# Patient Record
Sex: FEMALE | Race: ASIAN | Employment: OTHER | URBAN - METROPOLITAN AREA
[De-identification: names, ages, dates, MRNs, and addresses within clinical notes are randomized per-mention and may not be internally consistent; named-entity substitution may affect disease eponyms.]

---

## 2017-03-01 PROBLEM — M18.11 DEGENERATIVE ARTHRITIS OF THUMB, RIGHT: Status: ACTIVE | Noted: 2017-03-01

## 2017-03-01 PROBLEM — M18.12 DEGENERATIVE ARTHRITIS OF THUMB, LEFT: Status: ACTIVE | Noted: 2017-03-01

## 2017-06-22 PROBLEM — I83.90 ASYMPTOMATIC VARICOSE VEINS: Status: ACTIVE | Noted: 2017-06-22

## 2017-11-07 PROBLEM — M18.0 ARTHRITIS OF CARPOMETACARPAL (CMC) JOINTS OF BOTH THUMBS: Status: ACTIVE | Noted: 2017-11-07

## 2017-12-01 RX ORDER — LOSARTAN POTASSIUM 50 MG/1
TABLET ORAL
Qty: 90 TABLET | Refills: 0 | Status: SHIPPED | OUTPATIENT
Start: 2017-12-01 | End: 2017-12-04

## 2017-12-04 ENCOUNTER — OFFICE VISIT (OUTPATIENT)
Dept: INTERNAL MEDICINE CLINIC | Facility: CLINIC | Age: 72
End: 2017-12-04

## 2017-12-04 VITALS
HEIGHT: 63 IN | SYSTOLIC BLOOD PRESSURE: 126 MMHG | RESPIRATION RATE: 14 BRPM | BODY MASS INDEX: 21.62 KG/M2 | WEIGHT: 122 LBS | HEART RATE: 68 BPM | DIASTOLIC BLOOD PRESSURE: 68 MMHG

## 2017-12-04 DIAGNOSIS — Z12.31 VISIT FOR SCREENING MAMMOGRAM: ICD-10-CM

## 2017-12-04 DIAGNOSIS — Z00.00 ENCOUNTER FOR ANNUAL HEALTH EXAMINATION: ICD-10-CM

## 2017-12-04 PROBLEM — M18.11 DEGENERATIVE ARTHRITIS OF THUMB, RIGHT: Status: RESOLVED | Noted: 2017-03-01 | Resolved: 2017-12-04

## 2017-12-04 PROBLEM — M18.12 DEGENERATIVE ARTHRITIS OF THUMB, LEFT: Status: RESOLVED | Noted: 2017-03-01 | Resolved: 2017-12-04

## 2017-12-04 PROCEDURE — G0008 ADMIN INFLUENZA VIRUS VAC: HCPCS | Performed by: INTERNAL MEDICINE

## 2017-12-04 PROCEDURE — 90653 IIV ADJUVANT VACCINE IM: CPT | Performed by: INTERNAL MEDICINE

## 2017-12-04 PROCEDURE — G0402 INITIAL PREVENTIVE EXAM: HCPCS | Performed by: INTERNAL MEDICINE

## 2017-12-04 RX ORDER — LOSARTAN POTASSIUM 50 MG/1
50 TABLET ORAL
Qty: 90 TABLET | Refills: 3 | Status: SHIPPED | OUTPATIENT
Start: 2017-12-04 | End: 2018-12-06

## 2017-12-04 NOTE — PROGRESS NOTES
Bryon Cotton is a 67year old female who presents for a complete physical exam. Sees various specialists for various bone and joint issues  She went to see Dr Edwardo Plascencia and she did labs due to her medications and was told to see me    Wt Readings from Never Smoker                                                              Smokeless tobacco: Never Used                      Alcohol use: No              Occ: works [employer] office : n.  Children: 0, LMP:menopause, Exercise daily     REVIEW OF SYSTEMS equal,    ASSESSMENT AND PLAN:   Reva Jean is a 67year old female who presents for a complete physical exam.  breast and pelvic done. Order put in for mammogram- has osteopenia, off bisphos. , on vit d and calcium exercises.   Appropriate lab testing or

## 2017-12-04 NOTE — PROGRESS NOTES
HPI:   Eve Roth is a 67year old female who presents for a {Medicare Annual Wellness Description:3401}.     ***  Her last annual assessment has been over 1 year: Annual Physical due on 12/01/2017         Fall/Risk Assessment   She has been screened f Directive [place]::\"Advance care planning including the explanation and discussion of advance directives standard forms performed Face to Face with patient and Family/surrogate (if present), and forms available to patient in AVS\"}         She has n capsule (300 mg total) by mouth nightly.    Triamcinolone Acetonide 0.025 % External Ointment Apply to AA qd PRN for itching, redness and irritation   Desoximetasone 0.25 % External Cream Apply to affected area BID   Flaxseed, Linseed, (FLAX SEED OIL OR) [employer] Systems:7352::\" \"}    EXAM:   /68 (BP Location: Left arm, Patient Position: Sitting, Cuff Size: adult)   Pulse 68   Resp 14   Ht 63\"   Wt 122 lb   BMI 21.61 kg/m²  Estimated body mass index is 21.61 kg/m² as calculated from the following:    Aguilar months, have you lost more than 10 pounds without trying?: 2 - No  Has your appetite been poor?: No  How does the patient maintain a good energy level?: Appropriate Exercise  How would you describe your daily physical activity?: Moderate  How would you pawan Chlamydia  Annually if high risk No results found for: CHLAMYDIA No flowsheet data found.     Screening Mammogram      Mammogram Annually to 76, then as discussed Mammogram,1 Yr due on 11/30/2016 Update Health Maintenance if applicable     Immunizations (Up found.               Template: NAA PATEL MEDICARE ANNUAL ASSESSMENT FEMALE [57714]

## 2018-12-06 ENCOUNTER — OFFICE VISIT (OUTPATIENT)
Dept: INTERNAL MEDICINE CLINIC | Facility: CLINIC | Age: 73
End: 2018-12-06
Payer: MEDICARE

## 2018-12-06 ENCOUNTER — LABORATORY ENCOUNTER (OUTPATIENT)
Dept: LAB | Age: 73
End: 2018-12-06
Attending: INTERNAL MEDICINE
Payer: MEDICARE

## 2018-12-06 VITALS
WEIGHT: 117.63 LBS | SYSTOLIC BLOOD PRESSURE: 122 MMHG | OXYGEN SATURATION: 99 % | DIASTOLIC BLOOD PRESSURE: 80 MMHG | RESPIRATION RATE: 14 BRPM | HEART RATE: 51 BPM | BODY MASS INDEX: 22.21 KG/M2 | HEIGHT: 61 IN | TEMPERATURE: 98 F

## 2018-12-06 DIAGNOSIS — Z12.31 ENCOUNTER FOR SCREENING MAMMOGRAM FOR BREAST CANCER: Primary | ICD-10-CM

## 2018-12-06 DIAGNOSIS — K62.9 ANAL LESION: ICD-10-CM

## 2018-12-06 DIAGNOSIS — Z78.0 POSTMENOPAUSAL: ICD-10-CM

## 2018-12-06 DIAGNOSIS — I10 ESSENTIAL HYPERTENSION: ICD-10-CM

## 2018-12-06 DIAGNOSIS — Z00.00 ENCOUNTER FOR ANNUAL HEALTH EXAMINATION: ICD-10-CM

## 2018-12-06 DIAGNOSIS — M85.80 OSTEOPENIA, UNSPECIFIED LOCATION: ICD-10-CM

## 2018-12-06 PROCEDURE — 80061 LIPID PANEL: CPT

## 2018-12-06 PROCEDURE — 80053 COMPREHEN METABOLIC PANEL: CPT

## 2018-12-06 PROCEDURE — 90653 IIV ADJUVANT VACCINE IM: CPT | Performed by: INTERNAL MEDICINE

## 2018-12-06 PROCEDURE — 82306 VITAMIN D 25 HYDROXY: CPT

## 2018-12-06 PROCEDURE — 85025 COMPLETE CBC W/AUTO DIFF WBC: CPT

## 2018-12-06 PROCEDURE — 36415 COLL VENOUS BLD VENIPUNCTURE: CPT

## 2018-12-06 PROCEDURE — G0008 ADMIN INFLUENZA VIRUS VAC: HCPCS | Performed by: INTERNAL MEDICINE

## 2018-12-06 PROCEDURE — 99397 PER PM REEVAL EST PAT 65+ YR: CPT | Performed by: INTERNAL MEDICINE

## 2018-12-06 PROCEDURE — G0438 PPPS, INITIAL VISIT: HCPCS | Performed by: INTERNAL MEDICINE

## 2018-12-06 PROCEDURE — 84443 ASSAY THYROID STIM HORMONE: CPT

## 2018-12-06 RX ORDER — LOSARTAN POTASSIUM 50 MG/1
50 TABLET ORAL
Qty: 90 TABLET | Refills: 3 | Status: SHIPPED | OUTPATIENT
Start: 2018-12-06 | End: 2019-12-09

## 2018-12-06 NOTE — PATIENT INSTRUCTIONS
Stephanie Estes's SCREENING SCHEDULE   Tests on this list are recommended by your physician but may not be covered, or covered at this frequency, by your insurer. Please check with your insurance carrier before scheduling to verify coverage.    PREVENTATIVE 65-75) IPPE only No results found for this or any previous visit.  Limited to patients who meet one of the following criteria:   • Men who are 73-68 years old and have smoked more than 100 cigarettes in their lifetime   • Anyone with a family history    Col on 11/30/2016 Please get this Mammogram regularly   Immunizations      Influenza  Covered Annually Orders placed or performed in visit on 12/01/16   • FLU VACC [address] ANTIG   Orders placed or performed in visit on 11/18/14   • FLU VACC PRSV FREE INC http://www. [place]ph.state. [place]/public/books/advin.htm  A link to the [employer]. This site has a lot of good information including definitions of the different types of Advance Directives.  It also has the State forms available on it's webs

## 2018-12-06 NOTE — PROGRESS NOTES
Gavino Levi is a 68year old female who presents for a complete physical exam  And AWV  Wt Readings from Last 6 Encounters:  12/06/18 : 117 lb 9.6 oz  11/07/18 : 118 lb  05/02/18 : 108 lb  12/04/17 : 122 lb  10/25/17 : 116 lb  06/22/17 : 108 lb    Bod Smokeless tobacco: Never Used    Alcohol use: No    Drug use: No    Occ:retired PO : n.  Children: 0, LMP:menopause, Exercise daily     REVIEW OF SYSTEMS:   GENERAL: feels well otherwise  SKIN: denies any unusual skin lesions  EYES:denies blurred given for healthy living as indicated. The patient indicates understanding of these issues and agrees to the plan. HTN controlled, osteopenia, mild, vitamin d level was good, cpm  The patient is asked to return for CPX in 1 year.     Encounter for screening Trigs LDL Cholesterol Calc (mg/dL)   Date Value   09/19/2006 126 (H)     LDL CHOLESTROL (mg/dL)   Date Value   11/14/2013 93     LDL Cholesterol (mg/dL)   Date Value   11/30/2015 85     CHOLESTEROL, TOTAL (mg/dL)   Date Value   09/19/2006 212 (H)     KENROY Requires diagnosis related to osteoporosis or estrogen deficiency.    Biennial benefit unless patient has history of long-term glucocorticoid use for medical condition    Last Dexa Scan:   XR DEXA BONE DENSITOMETRY (CPT=77080) 12/08/2016    No flowsheet karen retarded   Persons who live in the same house as a HepB virus carrier   Homosexual men   Illicit injectable drug abusers     Tetanus Toxoid- Only covered with a cut with metal- TD and TDaP Not covered by Medicare Part B) No orders found for this or any pre

## 2019-11-20 ENCOUNTER — OFFICE VISIT (OUTPATIENT)
Dept: SURGERY | Facility: CLINIC | Age: 74
End: 2019-11-20
Payer: MEDICARE

## 2019-11-20 VITALS
WEIGHT: 118 LBS | BODY MASS INDEX: 20.91 KG/M2 | TEMPERATURE: 98 F | HEIGHT: 63 IN | HEART RATE: 53 BPM | DIASTOLIC BLOOD PRESSURE: 74 MMHG | SYSTOLIC BLOOD PRESSURE: 148 MMHG

## 2019-11-20 DIAGNOSIS — K92.2 INTESTINAL BLEEDING: Primary | ICD-10-CM

## 2019-11-20 DIAGNOSIS — Z86.010 HISTORY OF COLON POLYPS: ICD-10-CM

## 2019-11-20 PROCEDURE — 99203 OFFICE O/P NEW LOW 30 MIN: CPT | Performed by: SURGERY

## 2019-11-20 PROCEDURE — 46600 DIAGNOSTIC ANOSCOPY SPX: CPT | Performed by: SURGERY

## 2019-11-20 RX ORDER — POLYETHYLENE GLYCOL 3350, SODIUM CHLORIDE, SODIUM BICARBONATE, POTASSIUM CHLORIDE 420; 11.2; 5.72; 1.48 G/4L; G/4L; G/4L; G/4L
POWDER, FOR SOLUTION ORAL
Qty: 1 BOTTLE | Refills: 0 | Status: SHIPPED | OUTPATIENT
Start: 2019-11-20 | End: 2019-12-09 | Stop reason: ALTCHOICE

## 2019-11-20 NOTE — H&P
New Patient Visit Note       Active Problems      1. Intestinal bleeding    2. History of colon polyps        Chief Complaint   Patient presents with:  Rectal Bleeding: NP referred by Dr. Tello Gee for rectal bleeding.  Pt states she is not having pain MD JOHNY at [address] [place] [address]   • CATARACT EXTRACTION Bilateral 2016   • CERVICAL EPIDURAL N/A 8/3/2015    Performed by Pearl Lewis MD at [facility] N/A 7/14/2015    Performed by Pearl Lewis MD at [facility] GABAPENTIN 300 MG Oral Cap, TAKE ONE CAPSULE BY MOUTH EVERY NIGHT, Disp: 90 capsule, Rfl: 2  •  Losartan Potassium 50 MG Oral Tab, Take 1 tablet (50 mg total) by mouth once daily. , Disp: 90 tablet, Rfl: 3  •  Triamcinolone Acetonide 0.025 % External Ointme for behavioral problems and sleep disturbance.        Physical Findings   /74 (BP Location: Right arm)   Pulse 53   Temp 97.6 °F (36.4 °C) (Oral)   Ht 63\"   Wt 118 lb (53.5 kg)   BMI 20.90 kg/m²   Physical Exam   Constitutional: She is oriented to pe is agreeable to proceed with the colonoscopy. · If only internal hemorrhoids are found on colonoscopy, she would be a candidate for rubberbanding.   The risk, benefits, and alternatives to rubber banding internal hemorrhoids were discussed with her in deta

## 2019-12-03 ENCOUNTER — HOSPITAL ENCOUNTER (OUTPATIENT)
Dept: MAMMOGRAPHY | Age: 74
Discharge: HOME | End: 2019-12-03
Attending: INTERNAL MEDICINE
Payer: MEDICARE

## 2019-12-03 DIAGNOSIS — Z12.31 ENCOUNTER FOR SCREENING MAMMOGRAM FOR BREAST CANCER: ICD-10-CM

## 2019-12-03 PROCEDURE — 77063 BREAST TOMOSYNTHESIS BI: CPT | Performed by: INTERNAL MEDICINE

## 2019-12-03 PROCEDURE — 77067 SCR MAMMO BI INCL CAD: CPT | Performed by: INTERNAL MEDICINE

## 2019-12-09 ENCOUNTER — OFFICE VISIT (OUTPATIENT)
Dept: INTERNAL MEDICINE CLINIC | Facility: CLINIC | Age: 74
End: 2019-12-09
Payer: MEDICARE

## 2019-12-09 ENCOUNTER — TELEPHONE (OUTPATIENT)
Dept: INTERNAL MEDICINE CLINIC | Facility: CLINIC | Age: 74
End: 2019-12-09

## 2019-12-09 VITALS
OXYGEN SATURATION: 98 % | BODY MASS INDEX: 22.35 KG/M2 | RESPIRATION RATE: 14 BRPM | HEIGHT: 60.87 IN | SYSTOLIC BLOOD PRESSURE: 114 MMHG | DIASTOLIC BLOOD PRESSURE: 68 MMHG | WEIGHT: 118.38 LBS | HEART RATE: 51 BPM

## 2019-12-09 DIAGNOSIS — Z78.0 POSTMENOPAUSAL: ICD-10-CM

## 2019-12-09 DIAGNOSIS — Z11.59 NEED FOR HEPATITIS C SCREENING TEST: ICD-10-CM

## 2019-12-09 DIAGNOSIS — I10 ESSENTIAL HYPERTENSION: ICD-10-CM

## 2019-12-09 DIAGNOSIS — Z00.00 ENCOUNTER FOR ANNUAL HEALTH EXAMINATION: ICD-10-CM

## 2019-12-09 DIAGNOSIS — K62.5 RECTAL BLEEDING: ICD-10-CM

## 2019-12-09 DIAGNOSIS — Z12.31 BREAST CANCER SCREENING BY MAMMOGRAM: Primary | ICD-10-CM

## 2019-12-09 DIAGNOSIS — M85.80 OSTEOPENIA, UNSPECIFIED LOCATION: ICD-10-CM

## 2019-12-09 DIAGNOSIS — E78.00 HIGH CHOLESTEROL: ICD-10-CM

## 2019-12-09 PROCEDURE — G0439 PPPS, SUBSEQ VISIT: HCPCS | Performed by: INTERNAL MEDICINE

## 2019-12-09 PROCEDURE — 99214 OFFICE O/P EST MOD 30 MIN: CPT | Performed by: INTERNAL MEDICINE

## 2019-12-09 RX ORDER — LOSARTAN POTASSIUM 50 MG/1
50 TABLET ORAL
Qty: 90 TABLET | Refills: 3 | Status: SHIPPED | OUTPATIENT
Start: 2019-12-09 | End: 2020-12-14

## 2019-12-09 RX ORDER — CHLORHEXIDINE GLUCONATE 0.12 MG/ML
RINSE ORAL
Refills: 3 | COMMUNITY
Start: 2019-11-26 | End: 2021-12-16 | Stop reason: ALTCHOICE

## 2019-12-09 NOTE — PROGRESS NOTES
HPI:   Mario Morin is a 76year old female who presents for a Medicare Subsequent Annual Wellness visit (Pt already had Initial Annual Wellness). I also treat her for her HTN. HTN:Denies CP, dyspnea, orthopnea, edema or palpitations.  Denies headache Bradycardia     Hypertension     Osteopenia     Adenomatous colon polyp     Cervical spondylosis     Bilateral shoulder pain, unspecified chronicity     Asymptomatic varicose veins     Arthritis of carpometacarpal (CMC) joints of both thumbs    Wt Readings Bradycardia (8/10/2010), Carpal tunnel syndrome (5/15/2008), Carpal tunnel syndrome, bilateral (7/20/2015), Cervical radiculopathy (8/10/2010), Cervicalgia (5/15/2008), Degenerative cervical disc (5/15/2008), Hypertension, Internal hemorrhoids (7/10), Late symmetrical, trachea midline and thyroid not enlarged, symmetric, no tenderness/mass/nodules  Lungs: clear to auscultation bilaterally  Breasts:  normal appearance, no masses or tenderness  Heart: S1, S2 normal, no murmur, click, rub or gallop, regular rat PLAN:  The patient indicates understanding of these issues and agrees to the plan. Reinforced healthy diet, lifestyle, and exercise. No follow-ups on file.      Andres Silverman MD, 12/9/2019     General Health     In the past six months, have BONE DENSITOMETRY (CPT=77080) 12/08/2016    No flowsheet data found. Pap and Pelvic      Pap: Every 3 yrs age 21-68 or Pap+HPV every 5 yrs age 33-67, age 72 and older at high risk There are no preventive care reminders to display for this patient.  [place] Creatinine  Annually Creatinine, Serum (mg/dL)   Date Value   12/15/2011 0.60     CREATININE (mg/dL)   Date Value   11/14/2013 0.30 (L)     Creatinine (mg/dL)   Date Value   12/06/2018 0.74   10/25/2017 0.68    No flowsheet data found.     Drug Serum Con

## 2019-12-09 NOTE — PATIENT INSTRUCTIONS
Stephanie Estes's SCREENING SCHEDULE   Tests on this list are recommended by your physician but may not be covered, or covered at this frequency, by your insurer. Please check with your insurance carrier before scheduling to verify coverage.    PREVENTATIVE following criteria:   • Men who are 73-68 years old and have smoked more than 100 cigarettes in their lifetime   • Anyone with a family history    Colorectal Cancer Screening  Covered up to Age 76     Colonoscopy Screen   Covered every 10 years- more often get this Mammogram regularly   Immunizations      Influenza  Covered Annually Orders placed or performed in visit on 12/01/16   • FLU VACC [address] ANTIG   Orders placed or performed in visit on 11/18/14   • FLU VACC [address] ANTIG   Orders place http://[email or web address] [place]ph.state. [place]/public/books/advin.htm  A link to the [employer]. This site has a lot of good information including definitions of the different types of Advance Directives.  It also has the State forms available on it's webs

## 2019-12-11 NOTE — TELEPHONE ENCOUNTER
Spoke to [id number] @ Dr. Eugenia Winter office and patient did have a Colonoscopy done on 12/6/2019 @ [facility], but the Procedure has not been uploaded to Chart yet. The [facility] has not forwarded a copy to Dr. Eugenia Winter office either.   PSR

## 2019-12-12 ENCOUNTER — PATIENT OUTREACH (OUTPATIENT)
Dept: SURGERY | Facility: CLINIC | Age: 74
End: 2019-12-12

## 2019-12-12 ENCOUNTER — LAB ENCOUNTER (OUTPATIENT)
Dept: LAB | Age: 74
End: 2019-12-12
Attending: INTERNAL MEDICINE
Payer: MEDICARE

## 2019-12-12 DIAGNOSIS — I10 ESSENTIAL HYPERTENSION: ICD-10-CM

## 2019-12-12 DIAGNOSIS — Z11.59 NEED FOR HEPATITIS C SCREENING TEST: ICD-10-CM

## 2019-12-12 DIAGNOSIS — K62.5 RECTAL BLEEDING: ICD-10-CM

## 2019-12-12 DIAGNOSIS — E78.00 HIGH CHOLESTEROL: ICD-10-CM

## 2019-12-12 PROCEDURE — 86803 HEPATITIS C AB TEST: CPT

## 2019-12-12 PROCEDURE — 85025 COMPLETE CBC W/AUTO DIFF WBC: CPT

## 2019-12-12 PROCEDURE — 80053 COMPREHEN METABOLIC PANEL: CPT

## 2019-12-12 PROCEDURE — 36415 COLL VENOUS BLD VENIPUNCTURE: CPT

## 2019-12-12 PROCEDURE — 80061 LIPID PANEL: CPT

## 2019-12-13 RX ORDER — LOSARTAN POTASSIUM 50 MG/1
TABLET ORAL
Qty: 90 TABLET | Refills: 3 | OUTPATIENT
Start: 2019-12-13

## 2020-01-08 ENCOUNTER — OFFICE VISIT (OUTPATIENT)
Dept: SURGERY | Facility: CLINIC | Age: 75
End: 2020-01-08
Payer: MEDICARE

## 2020-01-08 VITALS
HEART RATE: 53 BPM | DIASTOLIC BLOOD PRESSURE: 71 MMHG | HEIGHT: 61 IN | WEIGHT: 118 LBS | TEMPERATURE: 97 F | BODY MASS INDEX: 22.28 KG/M2 | SYSTOLIC BLOOD PRESSURE: 135 MMHG

## 2020-01-08 DIAGNOSIS — K64.8 HEMORRHOIDS, INTERNAL, WITH BLEEDING: Primary | ICD-10-CM

## 2020-01-08 PROCEDURE — 99213 OFFICE O/P EST LOW 20 MIN: CPT | Performed by: SURGERY

## 2020-01-08 PROCEDURE — 46221 LIGATION OF HEMORRHOID(S): CPT | Performed by: SURGERY

## 2020-01-08 NOTE — H&P
New Patient Visit Note       Active Problems      1. Hemorrhoids, internal, with bleeding        Chief Complaint   Patient presents with:  Hemorrhoids: Est pt new problem, pt here for hemorrhoids.   Pt would also like the results of her colonoscopy, she wou 7/8/2010    Performed by Wallace Myers MD at [facility]   • OTHER SURGICAL HISTORY      L wrist surgery   • UPPER ARM/ELBOW SURGERY UNLISTED  2000    left distal radial/ulnar joint reconstruction       The family history and social histo affected area BID As Needed. ), Disp: 1 Tube, Rfl: 3  •  Flaxseed, Linseed, (FLAX SEED OIL OR), Take 1 tablet by mouth daily. , Disp: , Rfl:   •  aspirin 81 MG Oral Tab, Take 81 mg by mouth daily. , Disp: , Rfl:   •  MOVE FREE 500-400 MG OR TABS, 2 TABLETS D Rectum:      Internal hemorrhoid present. No rectal mass, anal fissure, tenderness or abnormal anal tone.       Genitourinary Comments: No Rectocele  No Rectovaginal Fistula  No Episiotomy  Anal Sphincter Intact  No Pruritis Ani  No Lichenification  No

## 2020-01-08 NOTE — PROCEDURES
Pre op diagnosis: Symptomatic Internal Hemorrhoids    Post op diagnosis:  Symptomatic Internal Hemorrhoids    Procedure: Anoscopy with O-ring Rubber Band Ligation of Internal Hemorrhoids, 9:00    Surgeon:  Dr. Alize Spivey    Operative findings:    The

## 2020-01-09 ENCOUNTER — HOSPITAL ENCOUNTER (OUTPATIENT)
Dept: BONE DENSITY | Age: 75
Discharge: HOME | End: 2020-01-09
Attending: INTERNAL MEDICINE
Payer: MEDICARE

## 2020-01-09 DIAGNOSIS — Z78.0 POSTMENOPAUSAL: ICD-10-CM

## 2020-01-09 PROCEDURE — 77080 DXA BONE DENSITY AXIAL: CPT | Performed by: INTERNAL MEDICINE

## 2020-01-10 ENCOUNTER — MED REC SCAN ONLY (OUTPATIENT)
Dept: SURGERY | Facility: CLINIC | Age: 75
End: 2020-01-10

## 2020-01-29 ENCOUNTER — OFFICE VISIT (OUTPATIENT)
Dept: SURGERY | Facility: CLINIC | Age: 75
End: 2020-01-29
Payer: MEDICARE

## 2020-01-29 VITALS — HEART RATE: 47 BPM | TEMPERATURE: 98 F | SYSTOLIC BLOOD PRESSURE: 123 MMHG | DIASTOLIC BLOOD PRESSURE: 65 MMHG

## 2020-01-29 DIAGNOSIS — K64.8 INTERNAL HEMORRHOID, BLEEDING: Primary | ICD-10-CM

## 2020-01-29 PROCEDURE — 46221 LIGATION OF HEMORRHOID(S): CPT | Performed by: SURGERY

## 2020-01-29 NOTE — PROGRESS NOTES
Follow Up Visit Note       Active Problems      1. Internal hemorrhoid, bleeding          Chief Complaint   Patient presents with:  Hemorrhoid Bandinnd banding -- Denies constipation, diarrhea, or rectal bleeding. Denies n/v, fevers or chills. ARM/ELBOW SURGERY UNLISTED  2000    left distal radial/ulnar joint reconstruction       The family history and social history have been reviewed by me today.     Family History   Problem Relation Age of Onset   • Other (Other) Father         head injury   • 500-400 MG OR TABS, 2 TABLETS DAILY, Disp: , Rfl:   •  MAGNESIUM OR, DAILY, Disp: , Rfl:   •  VITAMIN D 1000 UNIT OR CAPS, 1 CAPSULE DAILY, Disp: , Rfl:   •  CALCIUM 1500 MG OR TABS, DAILY, Disp: , Rfl:   •  MULTIVITAMINS OR TABS, 1 TABLET DAILY, Disp: , R Fistula  No Episiotomy  Anal Sphincter Intact  No Pruritis Ani  No Lichenification  No Abscess  No Fistula in ano  No Anterior Fissure  No Posterior Fissure    See Procedures:  Rubber Banding       Neurological: She is alert and oriented to person, place,

## 2020-02-12 ENCOUNTER — OFFICE VISIT (OUTPATIENT)
Dept: SURGERY | Facility: CLINIC | Age: 75
End: 2020-02-12
Payer: MEDICARE

## 2020-02-12 VITALS
HEART RATE: 53 BPM | BODY MASS INDEX: 22.28 KG/M2 | DIASTOLIC BLOOD PRESSURE: 75 MMHG | HEIGHT: 61 IN | WEIGHT: 118 LBS | TEMPERATURE: 98 F | SYSTOLIC BLOOD PRESSURE: 137 MMHG

## 2020-02-12 DIAGNOSIS — K64.8 HEMORRHOIDS, INTERNAL, WITH BLEEDING: Primary | ICD-10-CM

## 2020-02-12 PROCEDURE — 46221 LIGATION OF HEMORRHOID(S): CPT | Performed by: SURGERY

## 2020-02-12 NOTE — PROGRESS NOTES
Follow Up Visit Note       Active Problems      1. Hemorrhoids, internal, with bleeding          Chief Complaint   Patient presents with:  Hemorrhoid Banding: Patient is here for 3rd hemorrhoid banding.          History of Present Illness  The patient prese reconstruction       The family history and social history have been reviewed by me today.     Family History   Problem Relation Age of Onset   • Other (Other) Father         head injury   • Cancer Mother         kidney cancer   • Other (Other) Brother DAILY, Disp: , Rfl:   •  VITAMIN D 1000 UNIT OR CAPS, 1 CAPSULE DAILY, Disp: , Rfl:   •  CALCIUM 1500 MG OR TABS, DAILY, Disp: , Rfl:   •  MULTIVITAMINS OR TABS, 1 TABLET DAILY, Disp: , Rfl:   •  VITAMIN E, DAILY, Disp: , Rfl:      Review of Systems  The R Fissure    See Procedures:  Rubber Banding       Neurological: She is alert and oriented to person, place, and time. Skin: Skin is warm and dry. Psychiatric: She has a normal mood and affect.  Her behavior is normal. Judgment and thought content normal.

## 2020-02-13 NOTE — PROCEDURES
Pre op diagnosis: Symptomatic Internal Hemorrhoids    Post op diagnosis:  Symptomatic Internal Hemorrhoids    Procedure: Anoscopy with O-ring Rubber Band Ligation of Internal Hemorrhoids, 6:00    Surgeon:  Dr. Rod López    Operative findings:    The

## 2020-02-28 ENCOUNTER — OFFICE VISIT (OUTPATIENT)
Dept: SURGERY | Facility: CLINIC | Age: 75
End: 2020-02-28
Payer: MEDICARE

## 2020-02-28 VITALS
WEIGHT: 118 LBS | SYSTOLIC BLOOD PRESSURE: 118 MMHG | TEMPERATURE: 99 F | HEART RATE: 53 BPM | BODY MASS INDEX: 22 KG/M2 | DIASTOLIC BLOOD PRESSURE: 67 MMHG

## 2020-02-28 DIAGNOSIS — K64.8 HEMORRHOIDS, INTERNAL, WITH BLEEDING: Primary | ICD-10-CM

## 2020-02-28 PROCEDURE — 46221 LIGATION OF HEMORRHOID(S): CPT | Performed by: SURGERY

## 2020-02-28 NOTE — PROGRESS NOTES
Follow Up Visit Note       Active Problems      1.  Hemorrhoids, internal, with bleeding          Chief Complaint   Patient presents with:  Hemorrhoid Banding: pt here for banding, pt denies bleeding or pain        History of Present Illness  The patient pr left distal radial/ulnar joint reconstruction       The family history and social history have been reviewed by me today.     Family History   Problem Relation Age of Onset   • Other (Other) Father         head injury   • Cancer Mother         kidney cancer , Rfl:   •  MAGNESIUM OR, DAILY, Disp: , Rfl:   •  VITAMIN D 1000 UNIT OR CAPS, 1 CAPSULE DAILY, Disp: , Rfl:   •  CALCIUM 1500 MG OR TABS, DAILY, Disp: , Rfl:   •  MULTIVITAMINS OR TABS, 1 TABLET DAILY, Disp: , Rfl:   •  VITAMIN E, DAILY, Disp: , Rfl: Fistula in ano  No Anterior Fissure  No Posterior Fissure    See Procedures:  Rubber Banding       Neurological: She is alert and oriented to person, place, and time. Skin: Skin is warm and dry. Psychiatric: She has a normal mood and affect.  Her behavi

## 2020-05-04 ENCOUNTER — OFFICE VISIT (OUTPATIENT)
Dept: SURGERY | Facility: CLINIC | Age: 75
End: 2020-05-04
Payer: MEDICARE

## 2020-05-04 VITALS — WEIGHT: 118 LBS | TEMPERATURE: 98 F | HEIGHT: 61 IN | BODY MASS INDEX: 22.28 KG/M2

## 2020-05-04 DIAGNOSIS — K64.4 INFLAMED EXTERNAL HEMORRHOID: Primary | ICD-10-CM

## 2020-05-04 PROCEDURE — 99213 OFFICE O/P EST LOW 20 MIN: CPT | Performed by: SURGERY

## 2020-05-04 NOTE — PROGRESS NOTES
Follow Up Visit Note       Active Problems      1. Inflamed external hemorrhoid          Chief Complaint   Patient presents with:  Hemorrhoids: Est pt completed banding treatments nut is concerned about asymptomatic external hemorrhoid.  Pt denies any pain EPIDURAL N/A 7/14/2015    Performed by Priscila Pearce MD at [address]   • COLONOSCOPY  6/4/2005    adenomatous polyp   • COLONOSCOPY  7/8/2010    hemorrhoids   • COLONOSCOPY  12/06/2019    Internal Hemorrhoids  Repeat 5 yrs.    • COLON Acetonide 0.025 % External Ointment, Apply to AA qd PRN for itching, redness and irritation, Disp: 80 g, Rfl: 3  •  Desoximetasone 0.25 % External Cream, Apply to affected area BID (Patient taking differently: Apply to affected area BID As Needed. ), Disp: well-nourished. She is cooperative. HENT:   Head: Normocephalic and atraumatic. Eyes: No scleral icterus. Neck: No tracheal deviation present. Genitourinary: Rectum:      External hemorrhoid (7:00) present.       No rectal mass, anal fissure, tender

## 2020-05-18 ENCOUNTER — TELEPHONE (OUTPATIENT)
Dept: INTERNAL MEDICINE CLINIC | Facility: CLINIC | Age: 75
End: 2020-05-18

## 2020-05-18 NOTE — TELEPHONE ENCOUNTER
Spoke to pt. Pt states has had leftover medications of Losartan 50mg & is now running low. Pt to  refill today.

## 2020-05-18 NOTE — TELEPHONE ENCOUNTER
Pt states that the pharm said that she does not have anymore refills. Her chart says that she has one left. For that reason I did put in for a refill.      Did the patient contact the pharmacy directly?:  yes    Is patient out of meds or supply very low?:

## 2020-05-18 NOTE — TELEPHONE ENCOUNTER
Spoke to Donald from [address].  States they have 4 refills on file & pt had not picked any up. Pharmacy to process now & will be ready in 90 minutes. Pharmacy states will give courtesy call when ready.     Left detailed message on pt'

## 2020-12-14 ENCOUNTER — OFFICE VISIT (OUTPATIENT)
Dept: INTERNAL MEDICINE CLINIC | Facility: CLINIC | Age: 75
End: 2020-12-14
Payer: MEDICARE

## 2020-12-14 ENCOUNTER — LAB ENCOUNTER (OUTPATIENT)
Dept: LAB | Age: 75
End: 2020-12-14
Attending: INTERNAL MEDICINE
Payer: MEDICARE

## 2020-12-14 VITALS
WEIGHT: 123 LBS | TEMPERATURE: 98 F | SYSTOLIC BLOOD PRESSURE: 134 MMHG | RESPIRATION RATE: 14 BRPM | OXYGEN SATURATION: 97 % | BODY MASS INDEX: 23.22 KG/M2 | HEIGHT: 60.95 IN | HEART RATE: 54 BPM | DIASTOLIC BLOOD PRESSURE: 82 MMHG

## 2020-12-14 DIAGNOSIS — M25.512 BILATERAL SHOULDER PAIN, UNSPECIFIED CHRONICITY: ICD-10-CM

## 2020-12-14 DIAGNOSIS — Z12.31 BREAST CANCER SCREENING BY MAMMOGRAM: ICD-10-CM

## 2020-12-14 DIAGNOSIS — Z00.00 ENCOUNTER FOR ANNUAL HEALTH EXAMINATION: Primary | ICD-10-CM

## 2020-12-14 DIAGNOSIS — I10 ESSENTIAL HYPERTENSION: ICD-10-CM

## 2020-12-14 DIAGNOSIS — M25.511 BILATERAL SHOULDER PAIN, UNSPECIFIED CHRONICITY: ICD-10-CM

## 2020-12-14 DIAGNOSIS — M85.80 OSTEOPENIA, UNSPECIFIED LOCATION: ICD-10-CM

## 2020-12-14 PROBLEM — I83.90 ASYMPTOMATIC VARICOSE VEINS: Status: RESOLVED | Noted: 2017-06-22 | Resolved: 2020-12-14

## 2020-12-14 PROBLEM — M18.0 ARTHRITIS OF CARPOMETACARPAL (CMC) JOINTS OF BOTH THUMBS: Status: RESOLVED | Noted: 2017-11-07 | Resolved: 2020-12-14

## 2020-12-14 PROCEDURE — 80061 LIPID PANEL: CPT

## 2020-12-14 PROCEDURE — G0439 PPPS, SUBSEQ VISIT: HCPCS | Performed by: INTERNAL MEDICINE

## 2020-12-14 PROCEDURE — 36415 COLL VENOUS BLD VENIPUNCTURE: CPT

## 2020-12-14 PROCEDURE — 99213 OFFICE O/P EST LOW 20 MIN: CPT | Performed by: INTERNAL MEDICINE

## 2020-12-14 PROCEDURE — 80053 COMPREHEN METABOLIC PANEL: CPT

## 2020-12-14 RX ORDER — GABAPENTIN 300 MG/1
300 CAPSULE ORAL NIGHTLY
Qty: 90 CAPSULE | Refills: 3 | Status: SHIPPED | OUTPATIENT
Start: 2020-12-14 | End: 2021-01-18

## 2020-12-14 RX ORDER — LOSARTAN POTASSIUM 50 MG/1
50 TABLET ORAL
Qty: 90 TABLET | Refills: 3 | Status: SHIPPED | OUTPATIENT
Start: 2020-12-14 | End: 2021-12-16

## 2020-12-14 NOTE — PATIENT INSTRUCTIONS
Stephanie Estes's SCREENING SCHEDULE   Tests on this list are recommended by your physician but may not be covered, or covered at this frequency, by your insurer. Please check with your insurance carrier before scheduling to verify coverage.    PREVENTATIVE for this or any previous visit.  Limited to patients who meet one of the following criteria:   • Men who are 73-68 years old and have smoked more than 100 cigarettes in their lifetime   • Anyone with a family history    Colorectal Cancer Screening  Covered for this patient.  Please get this Mammogram regularly   Immunizations      Influenza  Covered Annually Orders placed or performed in visit on 12/01/16   • FLU VACC PRSV FREE INC ANTIG   Orders placed or performed in visit on 11/18/14   • FLU VACC PRSV FREE http://www. [place].state. [place]/public/books/advin.htm  A link to the [facility]. This site has a lot of good information including definitions of the different types of Advance Directives.  It also has the State forms available on it's webs

## 2020-12-14 NOTE — PROGRESS NOTES
HPI:   Anabell Mullins is a 76year old female who presents for a Medicare Subsequent Annual Wellness visit (Pt already had Initial Annual Wellness). I also treat her for her HTN. HTN:Denies CP, dyspnea, orthopnea, edema or palpitations.  Denies headache Edwar Gurrola MD as Consulting Physician (Physical Medicine)  Kendy Mcgregor MD as Consulting Physician (SURGERY, GENERAL)    Patient Active Problem List:     Bradycardia     Hypertension     Osteopenia     Adenomatous colon polyp     Cervical spondylosis Tab, Take 81 mg by mouth daily.     •  MOVE FREE 500-400 MG OR TABS, 2 TABLETS DAILY    •  MAGNESIUM OR, DAILY    •  VITAMIN D 1000 UNIT OR CAPS, 1 CAPSULE DAILY    •  CALCIUM 1500 MG OR TABS, DAILY    •  MULTIVITAMINS OR TABS, 1 TABLET DAILY    •  VITAMIN Ht 5' 0.95\" (1.548 m)   Wt 123 lb (55.8 kg)   SpO2 97%   BMI 23.28 kg/m²  Estimated body mass index is 23.28 kg/m² as calculated from the following:    Height as of this encounter: 5' 0.95\" (1.548 m). Weight as of this encounter: 123 lb (55.8 kg). goal, CPM  -     COMP METABOLIC PANEL (14); Future    Osteopenia, unspecified location- check DEXA next year.  She exercises regularly and her D level is quite healthy    Shoulder /neck girdle pain- chronic, does well w/nightly gabapentin, CPM        High c found for this or any previous visit. No flowsheet data found. Fecal Occult Blood Annually No results found for: FOB No flowsheet data found.     Glaucoma Screening      Ophthalmology Visit Annually: Diabetics, FHx Glaucoma, AA>50, > 65 No flows benefits      SPECIFIC DISEASE MONITORING Internal Lab or Procedure External Lab or Procedure      Annual Monitoring of Persistent     Medications (ACE/ARB, digoxin diuretics, anticonvulsants.)    Potassium  Annually Potassium (mmol/L)   Date Value   12/12

## 2020-12-21 ENCOUNTER — HOSPITAL ENCOUNTER (OUTPATIENT)
Dept: MAMMOGRAPHY | Age: 75
Discharge: HOME | End: 2020-12-21
Attending: INTERNAL MEDICINE
Payer: MEDICARE

## 2020-12-21 DIAGNOSIS — Z12.31 BREAST CANCER SCREENING BY MAMMOGRAM: ICD-10-CM

## 2020-12-21 PROCEDURE — 77067 SCR MAMMO BI INCL CAD: CPT | Performed by: INTERNAL MEDICINE

## 2020-12-21 PROCEDURE — 77063 BREAST TOMOSYNTHESIS BI: CPT | Performed by: INTERNAL MEDICINE

## 2021-03-17 ENCOUNTER — IMMUNIZATION (OUTPATIENT)
Dept: LAB | Facility: HOSPITAL | Age: 76
End: 2021-03-17
Attending: HOSPITALIST
Payer: MEDICARE

## 2021-03-17 DIAGNOSIS — Z23 NEED FOR VACCINATION: Primary | ICD-10-CM

## 2021-03-17 PROCEDURE — 0011A SARSCOV2 VAC 100MCG/0.5ML IM: CPT

## 2021-04-14 ENCOUNTER — IMMUNIZATION (OUTPATIENT)
Dept: LAB | Facility: HOSPITAL | Age: 76
End: 2021-04-14
Attending: EMERGENCY MEDICINE
Payer: MEDICARE

## 2021-04-14 DIAGNOSIS — Z23 NEED FOR VACCINATION: Primary | ICD-10-CM

## 2021-04-14 PROCEDURE — 0012A SARSCOV2 VAC 100MCG/0.5ML IM: CPT

## 2021-10-19 ENCOUNTER — TELEPHONE (OUTPATIENT)
Dept: INTERNAL MEDICINE CLINIC | Facility: CLINIC | Age: 76
End: 2021-10-19

## 2021-10-19 NOTE — TELEPHONE ENCOUNTER
Noted last Dexa scan on 1/9/20  Dr. Cesar Riley recommended to repeat in 2 years  Advised pt to discuss dexa scan orders with [facility] in upcoming appt on 12/16/21  Pt v/u

## 2021-10-19 NOTE — TELEPHONE ENCOUNTER
Pt called and stated that she thinks that she is due for a dexa scan. Is Pt due for one and if yes can one be ordered for Pt?    Please let Pt know if dexa scan gets ordered   Thank you

## 2021-12-16 ENCOUNTER — OFFICE VISIT (OUTPATIENT)
Dept: INTERNAL MEDICINE CLINIC | Facility: CLINIC | Age: 76
End: 2021-12-16
Payer: MEDICARE

## 2021-12-16 ENCOUNTER — LAB ENCOUNTER (OUTPATIENT)
Dept: LAB | Age: 76
End: 2021-12-16
Attending: INTERNAL MEDICINE
Payer: MEDICARE

## 2021-12-16 VITALS
HEIGHT: 60.79 IN | HEART RATE: 52 BPM | TEMPERATURE: 98 F | BODY MASS INDEX: 23.05 KG/M2 | SYSTOLIC BLOOD PRESSURE: 118 MMHG | DIASTOLIC BLOOD PRESSURE: 64 MMHG | WEIGHT: 120.5 LBS | RESPIRATION RATE: 14 BRPM | OXYGEN SATURATION: 97 %

## 2021-12-16 DIAGNOSIS — Z78.0 POSTMENOPAUSAL: ICD-10-CM

## 2021-12-16 DIAGNOSIS — M85.80 OSTEOPENIA, UNSPECIFIED LOCATION: ICD-10-CM

## 2021-12-16 DIAGNOSIS — I10 PRIMARY HYPERTENSION: ICD-10-CM

## 2021-12-16 DIAGNOSIS — Z00.00 ENCOUNTER FOR ANNUAL HEALTH EXAMINATION: ICD-10-CM

## 2021-12-16 DIAGNOSIS — Z12.31 SCREENING MAMMOGRAM FOR BREAST CANCER: Primary | ICD-10-CM

## 2021-12-16 PROCEDURE — 80053 COMPREHEN METABOLIC PANEL: CPT

## 2021-12-16 PROCEDURE — 99213 OFFICE O/P EST LOW 20 MIN: CPT | Performed by: INTERNAL MEDICINE

## 2021-12-16 PROCEDURE — G0439 PPPS, SUBSEQ VISIT: HCPCS | Performed by: INTERNAL MEDICINE

## 2021-12-16 PROCEDURE — 36415 COLL VENOUS BLD VENIPUNCTURE: CPT

## 2021-12-16 RX ORDER — GABAPENTIN 300 MG/1
300 CAPSULE ORAL NIGHTLY
COMMUNITY
Start: 2021-11-18

## 2021-12-16 RX ORDER — LOSARTAN POTASSIUM 50 MG/1
50 TABLET ORAL
Qty: 90 TABLET | Refills: 3 | Status: SHIPPED | OUTPATIENT
Start: 2021-12-16

## 2021-12-16 NOTE — PROGRESS NOTES
HPI:   Marcial Ayala is a 68year old female who presents for a Medicare Subsequent Annual Wellness visit (Pt already had Initial Annual Wellness). I also treat her for her HTN. HTN:Denies CP, dyspnea, orthopnea, edema or palpitations.  Denies headache o Adenomatous colon polyp     Cervical spondylosis     Bilateral shoulder pain, unspecified chronicity    Wt Readings from Last 3 Encounters:  12/16/21 : 120 lb 8 oz (54.7 kg)  11/18/21 : 120 lb (54.4 kg)  05/20/21 : 123 lb (55.8 kg)     Last Cholesterol Lab (7/20/2015), Cervical radiculopathy (8/10/2010), Cervicalgia (5/15/2008), Degenerative cervical disc (5/15/2008), Hypertension, Internal hemorrhoids (7/10), Lateral epicondylitis  of elbow (4/14/2011), Myofascial pain syndrome, Osteopenia, Pain in thoracic all    Feeling down, depressed, or hopeless: Not at all    PHQ-2 SCORE: 0                 General appearance: alert, appears stated age and cooperative  Neck: no adenopathy, no carotid bruit, no JVD, supple, symmetrical, trachea midline and thyroid not enl level is quite healthy    Shoulder /neck girdle pain- per physiatry        High cholesterol- LDL <130,not on statin, she is low risk for CVD  -  No need to check this year         Diet assessment: excellent     PLAN:  The patient indicates understanding of Glaucoma Screening      Ophthalmology Visit Annually: Diabetics, FHx Glaucoma, AA>50, > 65 No flowsheet data found.     Bone Density Screening      Dexascan Every two years Last Dexa Scan:    XR DEXA BONE DENSITOMETRY (CPT=77080) 01/09/2020   No f Component Value Date    K 3.9 12/14/2020         Creatinine   Annually Lab Results   Component Value Date    CREATSERUM 0.65 12/14/2020         BUN Annually Lab Results   Component Value Date    BUN 17 12/14/2020       Drug Serum Conc Annually No results

## 2021-12-16 NOTE — PATIENT INSTRUCTIONS
Stephanie Estes's SCREENING SCHEDULE   Tests on this list are recommended by your physician but may not be covered, or covered at this frequency, by your insurer. Please check with your insurance carrier before scheduling to verify coverage.    PREVENTATIVE DENSITOMETRY (CPT=77080) 01/09/2020      No recommendations at this time   Pap and Pelvic    Pap   Covered every 2 years for women at normal risk;  Annually if at high risk -  No recommendations at this time    Chlamydia Annually if high risk -  No recommen http://www. [place]state. [place]/public/books/advin.htm  A link to the [employer]. This site has a lot of good information including definitions of the different types of Advance Directives.  It also has the State forms available on it's webs

## 2022-01-11 NOTE — PROCEDURES
Pre op diagnosis: Symptomatic Internal Hemorrhoids    Post op diagnosis:  Symptomatic Internal Hemorrhoids    Procedure: Anoscopy with O-ring Rubber Band Ligation of Internal Hemorrhoids, 7:00    Surgeon:  Dr. Vandana Hernandez    Operative findings:    The Oxybutynin Pregnancy And Lactation Text: This medication is Pregnancy Category B and is considered safe during pregnancy. It is unknown if it is excreted in breast milk.

## 2022-01-24 ENCOUNTER — HOSPITAL ENCOUNTER (OUTPATIENT)
Dept: MAMMOGRAPHY | Age: 77
Discharge: HOME | End: 2022-01-24
Attending: INTERNAL MEDICINE
Payer: MEDICARE

## 2022-01-24 ENCOUNTER — APPOINTMENT (OUTPATIENT)
Dept: BONE DENSITY | Age: 77
End: 2022-01-24
Attending: INTERNAL MEDICINE
Payer: MEDICARE

## 2022-01-24 DIAGNOSIS — Z12.31 SCREENING MAMMOGRAM FOR BREAST CANCER: ICD-10-CM

## 2022-01-24 PROCEDURE — 77067 SCR MAMMO BI INCL CAD: CPT | Performed by: INTERNAL MEDICINE

## 2022-01-24 PROCEDURE — 77063 BREAST TOMOSYNTHESIS BI: CPT | Performed by: INTERNAL MEDICINE

## 2022-02-04 ENCOUNTER — HOSPITAL ENCOUNTER (OUTPATIENT)
Dept: BONE DENSITY | Age: 77
Discharge: HOME | End: 2022-02-04
Attending: INTERNAL MEDICINE
Payer: MEDICARE

## 2022-02-04 DIAGNOSIS — Z78.0 POSTMENOPAUSAL: ICD-10-CM

## 2022-02-04 PROCEDURE — 77080 DXA BONE DENSITY AXIAL: CPT | Performed by: INTERNAL MEDICINE

## 2022-02-17 ENCOUNTER — TELEPHONE (OUTPATIENT)
Dept: INTERNAL MEDICINE CLINIC | Facility: CLINIC | Age: 77
End: 2022-02-17

## 2022-02-17 NOTE — TELEPHONE ENCOUNTER
Pt called and stated that they have been taking high blood pressure medication. Pt would like to check her heart to see if things have improved and would like for Dr. Mike Kirk to order a ct heart scan. Please advise, thank you.

## 2022-11-22 ENCOUNTER — TELEPHONE (OUTPATIENT)
Dept: INTERNAL MEDICINE CLINIC | Facility: CLINIC | Age: 77
End: 2022-11-22

## 2022-11-22 DIAGNOSIS — I10 ESSENTIAL HYPERTENSION: Primary | ICD-10-CM

## 2022-11-22 RX ORDER — LOSARTAN POTASSIUM 50 MG/1
50 TABLET ORAL
Qty: 30 TABLET | Refills: 0 | Status: SHIPPED | OUTPATIENT
Start: 2022-11-22 | End: 2022-11-28

## 2022-11-22 NOTE — TELEPHONE ENCOUNTER
Is this medication prescribed by the [facility] Prov[facility]? Dr. Laxmi Morales    Did the patient contact the pharmacy directly?:  yes    Is patient out of meds or supply very low?:  Pt is out of medication    Medication Requested:  losartan 50 MG Oral Tab    Dose:      Is patient requesting a 30 or 90 day supply?: 30? Pharmacy name and phone # or location:  [address], [facility]  [address], [phone], [phone]    Is the patient due for an appointment?: see notes  (if so, please schedule appt)    Additional Notes: Pt is establishing care at [place] [address] however, she cant get an appointment until february and they wont refill this medication. They told her she will need to reach out to former provider's office. Patient is out of this medication     Please advise the patient refills take up to 72 business hours.

## 2022-11-23 NOTE — TELEPHONE ENCOUNTER
Pt made med check appt with Annika Peguero on 11/28/22.  Pt has new PCP but cant get in to see them until Feb.

## 2022-11-28 ENCOUNTER — OFFICE VISIT (OUTPATIENT)
Dept: INTERNAL MEDICINE CLINIC | Facility: CLINIC | Age: 77
End: 2022-11-28
Payer: MEDICARE

## 2022-11-28 VITALS
HEIGHT: 60 IN | HEART RATE: 63 BPM | OXYGEN SATURATION: 96 % | SYSTOLIC BLOOD PRESSURE: 126 MMHG | RESPIRATION RATE: 18 BRPM | TEMPERATURE: 98 F | DIASTOLIC BLOOD PRESSURE: 80 MMHG | WEIGHT: 124 LBS | BODY MASS INDEX: 24.35 KG/M2

## 2022-11-28 DIAGNOSIS — I10 ESSENTIAL HYPERTENSION: ICD-10-CM

## 2022-11-28 RX ORDER — LOSARTAN POTASSIUM 50 MG/1
50 TABLET ORAL
Qty: 90 TABLET | Refills: 0 | Status: SHIPPED | OUTPATIENT
Start: 2022-11-28

## (undated) DIAGNOSIS — I10 PRIMARY HYPERTENSION: Primary | ICD-10-CM

## (undated) DIAGNOSIS — I10 ESSENTIAL HYPERTENSION: ICD-10-CM

## (undated) NOTE — Clinical Note
I had the pleasure of seeing Divya Estes on 1/8/2020. Please see my attached note. Kristi Jj MD [facility]--Surgery

## (undated) NOTE — Clinical Note
I had the pleasure of seeing Cuong Estes on 11/20/2019. Please see my attached note. Reva Cordoba MD [facility]--Surgery

## (undated) NOTE — Clinical Note
I had the pleasure of seeing Miquel Estes on 2/28/2020. Please see my attached note. Sd Bay MD [facility]--Surgery

## (undated) NOTE — LETTER
December 7, 2018     [address]  [address] [facility]      Dear Carmen Bound:    Below are the results from your recent visit: NORMAL    Your cholesterol is slightly higher, but Dr. Cleveland Jeffries does not recommend any treatment Lymphocyte Absolute 1.04 0.90 - 4.00 x10(3) uL    Monocyte Absolute 0.54 0.10 - 1.00 x10(3) uL    Eosinophil Absolute 0.06 0.00 - 0.30 x10(3) uL    Basophil Absolute 0.03 0.00 - 0.10 x10(3) uL    Immature Granulocyte Absolute 0.02 0.00 - 1.00 x10(3) uL

## (undated) NOTE — LETTER
[facility]  A. Notifier:    STEVE Salgado  [id number]    Advance Beneficiary Notice of Noncoverage (ABN)    Note:  If Medicare doesn't pay for D. Physical  below, you may have to pay.   Medicare does not pay for everything, even some car Option 3:  I don't want the D.    listed above. I understand with this choice I am not responsible for payment, and I cannot appeal to see if Medicare would pay. H.  Additional Information:     This notice gives our opinion, not an official Medicare de

## (undated) NOTE — Clinical Note
I had the pleasure of seeing Anyi Estes on 5/4/2020. Please see my attached note. Elaina Sol MD [facility]--Surgery